# Patient Record
Sex: FEMALE | Race: AMERICAN INDIAN OR ALASKA NATIVE
[De-identification: names, ages, dates, MRNs, and addresses within clinical notes are randomized per-mention and may not be internally consistent; named-entity substitution may affect disease eponyms.]

---

## 2017-04-01 ENCOUNTER — HOSPITAL ENCOUNTER (EMERGENCY)
Dept: HOSPITAL 42 - ED | Age: 57
Discharge: HOME | End: 2017-04-01
Payer: COMMERCIAL

## 2017-04-01 VITALS — BODY MASS INDEX: 47.1 KG/M2

## 2017-04-01 VITALS — SYSTOLIC BLOOD PRESSURE: 151 MMHG | DIASTOLIC BLOOD PRESSURE: 87 MMHG | OXYGEN SATURATION: 95 % | HEART RATE: 92 BPM

## 2017-04-01 VITALS — RESPIRATION RATE: 22 BRPM | TEMPERATURE: 99.3 F

## 2017-04-01 DIAGNOSIS — J20.9: Primary | ICD-10-CM

## 2017-04-01 DIAGNOSIS — F17.210: ICD-10-CM

## 2017-04-01 LAB
ADD MANUAL DIFF?: NO
ALBUMIN/GLOB SERPL: 1 {RATIO} (ref 1.1–1.8)
ALP SERPL-CCNC: 94 U/L (ref 38–133)
ALT SERPL-CCNC: 23 U/L (ref 7–56)
APPEARANCE UR: CLEAR
APTT BLD: 30.8 SECONDS (ref 23.7–30.8)
AST SERPL-CCNC: 36 U/L (ref 15–39)
BASOPHILS # BLD AUTO: 0.01 K/MM3 (ref 0–2)
BASOPHILS NFR BLD: 0.2 % (ref 0–3)
BILIRUB SERPL-MCNC: 0.7 MG/DL (ref 0.2–1.3)
BILIRUB UR-MCNC: NEGATIVE MG/DL
BUN SERPL-MCNC: 6 MG/DL (ref 7–21)
CALCIUM SERPL-MCNC: 9.4 MG/DL (ref 8.4–10.5)
CHLORIDE SERPL-SCNC: 99 MMOL/L (ref 98–107)
CO2 SERPL-SCNC: 27 MMOL/L (ref 21–33)
COLOR UR: YELLOW
EOSINOPHIL # BLD: 0 10*3/UL (ref 0–0.7)
EOSINOPHIL NFR BLD: 0.2 % (ref 1.5–5)
ERYTHROCYTE [DISTWIDTH] IN BLOOD BY AUTOMATED COUNT: 14.9 % (ref 11.5–14.5)
GLOBULIN SER-MCNC: 4.5 GM/DL
GLUCOSE SERPL-MCNC: 143 MG/DL (ref 70–110)
GLUCOSE UR STRIP-MCNC: NEGATIVE MG/DL
GRANULOCYTES # BLD: 3.14 10*3/UL (ref 1.4–6.5)
GRANULOCYTES NFR BLD: 73.8 % (ref 50–68)
HCT VFR BLD CALC: 38.3 % (ref 36–48)
INR PPP: 1.07 (ref 0.93–1.08)
KETONES UR STRIP-MCNC: NEGATIVE MG/DL
LEUKOCYTE ESTERASE UR-ACNC: NEGATIVE LEU/UL
LYMPHOCYTES # BLD: 0.8 10*3/UL (ref 1.2–3.4)
LYMPHOCYTES NFR BLD AUTO: 18.3 % (ref 22–35)
MCH RBC QN AUTO: 30.7 PG (ref 25–35)
MCHC RBC AUTO-ENTMCNC: 33.7 G/DL (ref 31–37)
MCV RBC AUTO: 91.2 FL (ref 80–105)
MONOCYTES # BLD AUTO: 0.3 10*3/UL (ref 0.1–0.6)
MONOCYTES NFR BLD: 7.5 % (ref 1–6)
PH UR STRIP: 6 [PH] (ref 4.7–8)
PLATELET # BLD: 162 10^3/UL (ref 120–450)
PMV BLD AUTO: 11.4 FL (ref 7–11)
POTASSIUM SERPL-SCNC: 3.8 MMOL/L (ref 3.6–5)
PROT SERPL-MCNC: 8.9 G/DL (ref 5.8–8.3)
PROT UR STRIP-MCNC: NEGATIVE MG/DL
RBC # UR STRIP: NEGATIVE /UL
SODIUM SERPL-SCNC: 138 MMOL/L (ref 132–148)
SP GR UR STRIP: 1.01 (ref 1–1.03)
UROBILINOGEN UR STRIP-ACNC: 2 E.U./DL
WBC # BLD AUTO: 4.3 10^3/UL (ref 4.5–11)

## 2017-06-07 ENCOUNTER — HOSPITAL ENCOUNTER (INPATIENT)
Dept: HOSPITAL 42 - ED | Age: 57
LOS: 3 days | Discharge: HOME | DRG: 638 | End: 2017-06-10
Attending: FAMILY MEDICINE | Admitting: FAMILY MEDICINE
Payer: COMMERCIAL

## 2017-06-07 VITALS — BODY MASS INDEX: 44.6 KG/M2

## 2017-06-07 DIAGNOSIS — E11.65: Primary | ICD-10-CM

## 2017-06-07 DIAGNOSIS — I25.10: ICD-10-CM

## 2017-06-07 DIAGNOSIS — Z95.5: ICD-10-CM

## 2017-06-07 DIAGNOSIS — E78.5: ICD-10-CM

## 2017-06-07 DIAGNOSIS — I10: ICD-10-CM

## 2017-06-07 DIAGNOSIS — K42.9: ICD-10-CM

## 2017-06-07 DIAGNOSIS — E87.1: ICD-10-CM

## 2017-06-07 DIAGNOSIS — Z79.4: ICD-10-CM

## 2017-06-07 DIAGNOSIS — Z91.11: ICD-10-CM

## 2017-06-07 DIAGNOSIS — I25.2: ICD-10-CM

## 2017-06-07 DIAGNOSIS — Z87.891: ICD-10-CM

## 2017-06-07 DIAGNOSIS — E66.01: ICD-10-CM

## 2017-06-07 DIAGNOSIS — D64.9: ICD-10-CM

## 2017-06-07 DIAGNOSIS — K21.9: ICD-10-CM

## 2017-06-07 DIAGNOSIS — J45.909: ICD-10-CM

## 2017-06-07 LAB
ADD MANUAL DIFF?: NO
ALBUMIN/GLOB SERPL: 1.2 {RATIO} (ref 1.1–1.8)
ALP SERPL-CCNC: 112 U/L (ref 38–133)
ALT SERPL-CCNC: 45 U/L (ref 7–56)
APPEARANCE UR: (no result)
APTT BLD: 28.3 SECONDS (ref 23.7–30.8)
AST SERPL-CCNC: 34 U/L (ref 15–39)
BASE EXCESS BLDV CALC-SCNC: -1.8 MMOL/L (ref 0–2)
BASOPHILS # BLD AUTO: 0.02 K/MM3 (ref 0–2)
BASOPHILS NFR BLD: 0.3 % (ref 0–3)
BILIRUB SERPL-MCNC: 0.6 MG/DL (ref 0.2–1.3)
BILIRUB UR-MCNC: NEGATIVE MG/DL
BUN SERPL-MCNC: 16 MG/DL (ref 7–21)
CALCIUM SERPL-MCNC: 10.1 MG/DL (ref 8.4–10.5)
CHLORIDE SERPL-SCNC: 98 MMOL/L (ref 98–107)
CO2 SERPL-SCNC: 22 MMOL/L (ref 21–33)
COLOR UR: (no result)
EOSINOPHIL # BLD: 0 10*3/UL (ref 0–0.7)
EOSINOPHIL NFR BLD: 0.7 % (ref 1.5–5)
ERYTHROCYTE [DISTWIDTH] IN BLOOD BY AUTOMATED COUNT: 13.9 % (ref 11.5–14.5)
GLOBULIN SER-MCNC: 3.8 GM/DL
GLUCOSE SERPL-MCNC: 616 MG/DL (ref 70–110)
GLUCOSE UR STRIP-MCNC: >=1000 MG/DL
GRANULOCYTES # BLD: 3.81 10*3/UL (ref 1.4–6.5)
GRANULOCYTES NFR BLD: 62.3 % (ref 50–68)
HCT VFR BLD CALC: 39.5 % (ref 36–48)
INR PPP: 1.03 (ref 0.93–1.08)
KETONES UR STRIP-MCNC: NEGATIVE MG/DL
LEUKOCYTE ESTERASE UR-ACNC: (no result) LEU/UL
LYMPHOCYTES # BLD: 1.9 10*3/UL (ref 1.2–3.4)
LYMPHOCYTES NFR BLD AUTO: 30.6 % (ref 22–35)
MCH RBC QN AUTO: 30 PG (ref 25–35)
MCHC RBC AUTO-ENTMCNC: 33.7 G/DL (ref 31–37)
MCV RBC AUTO: 89 FL (ref 80–105)
MONOCYTES # BLD AUTO: 0.4 10*3/UL (ref 0.1–0.6)
MONOCYTES NFR BLD: 6.1 % (ref 1–6)
PH BLDV: 7.38 [PH] (ref 7.32–7.43)
PH UR STRIP: 6 [PH] (ref 4.7–8)
PLATELET # BLD: 169 10^3/UL (ref 120–450)
PMV BLD AUTO: 12.9 FL (ref 7–11)
POTASSIUM SERPL-SCNC: 4.5 MMOL/L (ref 3.6–5)
PROT SERPL-MCNC: 8.2 G/DL (ref 5.8–8.3)
PROT UR STRIP-MCNC: 30 MG/DL
RBC # UR STRIP: (no result) /UL
RBC #/AREA URNS HPF: (no result) /HPF (ref 0–2)
SODIUM SERPL-SCNC: 133 MMOL/L (ref 132–148)
SP GR UR STRIP: 1.01 (ref 1–1.03)
UROBILINOGEN UR STRIP-ACNC: 0.2 E.U./DL
WBC # BLD AUTO: 6.1 10^3/UL (ref 4.5–11)

## 2017-06-07 RX ADMIN — INSULIN LISPRO SCH UNITS: 100 INJECTION, SOLUTION INTRAVENOUS; SUBCUTANEOUS at 21:58

## 2017-06-07 RX ADMIN — INSULIN LISPRO SCH UNITS: 100 INJECTION, SOLUTION INTRAVENOUS; SUBCUTANEOUS at 16:41

## 2017-06-07 RX ADMIN — INSULIN LISPRO SCH UNITS: 100 INJECTION, SOLUTION INTRAVENOUS; SUBCUTANEOUS at 16:44

## 2017-06-08 VITALS — RESPIRATION RATE: 20 BRPM

## 2017-06-08 LAB
ALBUMIN/GLOB SERPL: 1.1 {RATIO} (ref 1.1–1.8)
ALP SERPL-CCNC: 87 U/L (ref 38–133)
ALT SERPL-CCNC: 44 U/L (ref 7–56)
AST SERPL-CCNC: 33 U/L (ref 15–39)
BILIRUB SERPL-MCNC: 0.5 MG/DL (ref 0.2–1.3)
BUN SERPL-MCNC: 10 MG/DL (ref 7–21)
CALCIUM SERPL-MCNC: 9 MG/DL (ref 8.4–10.5)
CHLORIDE SERPL-SCNC: 106 MMOL/L (ref 98–107)
CHOLEST SERPL-MCNC: 156 MG/DL (ref 130–200)
CO2 SERPL-SCNC: 22 MMOL/L (ref 21–33)
CORTIS AM PEAK SERPL-MCNC: 11 UG/DL (ref 4.46–22.7)
ERYTHROCYTE [DISTWIDTH] IN BLOOD BY AUTOMATED COUNT: 14.2 % (ref 11.5–14.5)
GLOBULIN SER-MCNC: 3.3 GM/DL
GLUCOSE SERPL-MCNC: 308 MG/DL (ref 70–110)
HCT VFR BLD CALC: 34.6 % (ref 36–48)
MCH RBC QN AUTO: 29.8 PG (ref 25–35)
MCHC RBC AUTO-ENTMCNC: 33.5 G/DL (ref 31–37)
MCV RBC AUTO: 88.9 FL (ref 80–105)
PLATELET # BLD: 159 10^3/UL (ref 120–450)
PMV BLD AUTO: 12.6 FL (ref 7–11)
POTASSIUM SERPL-SCNC: 3.7 MMOL/L (ref 3.6–5)
PROT SERPL-MCNC: 7 G/DL (ref 5.8–8.3)
SODIUM SERPL-SCNC: 137 MMOL/L (ref 132–148)
WBC # BLD AUTO: 5 10^3/UL (ref 4.5–11)

## 2017-06-08 RX ADMIN — INSULIN LISPRO SCH UNITS: 100 INJECTION, SOLUTION INTRAVENOUS; SUBCUTANEOUS at 12:33

## 2017-06-08 RX ADMIN — INSULIN LISPRO SCH UNITS: 100 INJECTION, SOLUTION INTRAVENOUS; SUBCUTANEOUS at 07:45

## 2017-06-08 RX ADMIN — INSULIN LISPRO SCH: 100 INJECTION, SOLUTION INTRAVENOUS; SUBCUTANEOUS at 21:37

## 2017-06-08 RX ADMIN — INSULIN LISPRO SCH UNITS: 100 INJECTION, SOLUTION INTRAVENOUS; SUBCUTANEOUS at 17:43

## 2017-06-09 LAB
ALBUMIN/GLOB SERPL: 1.3 {RATIO} (ref 1.1–1.8)
ALP SERPL-CCNC: 91 U/L (ref 38–133)
ALT SERPL-CCNC: 49 U/L (ref 7–56)
AST SERPL-CCNC: 42 U/L (ref 15–39)
BILIRUB SERPL-MCNC: 0.6 MG/DL (ref 0.2–1.3)
BUN SERPL-MCNC: 9 MG/DL (ref 7–21)
CALCIUM SERPL-MCNC: 9.2 MG/DL (ref 8.4–10.5)
CHLORIDE SERPL-SCNC: 104 MMOL/L (ref 95–110)
CO2 SERPL-SCNC: 22 MMOL/L (ref 21–33)
GLOBULIN SER-MCNC: 3.4 GM/DL
GLUCOSE SERPL-MCNC: 338 MG/DL (ref 70–110)
POTASSIUM SERPL-SCNC: 4.2 MMOL/L (ref 3.6–5)
PROT SERPL-MCNC: 7.7 G/DL (ref 5.8–8.3)
SODIUM SERPL-SCNC: 138 MMOL/L (ref 132–148)

## 2017-06-09 RX ADMIN — INSULIN LISPRO SCH UNITS: 100 INJECTION, SOLUTION INTRAVENOUS; SUBCUTANEOUS at 19:10

## 2017-06-09 RX ADMIN — INSULIN LISPRO SCH: 100 INJECTION, SOLUTION INTRAVENOUS; SUBCUTANEOUS at 22:50

## 2017-06-09 RX ADMIN — INSULIN LISPRO SCH UNITS: 100 INJECTION, SOLUTION INTRAVENOUS; SUBCUTANEOUS at 08:57

## 2017-06-09 RX ADMIN — INSULIN LISPRO SCH UNITS: 100 INJECTION, SOLUTION INTRAVENOUS; SUBCUTANEOUS at 13:32

## 2017-06-09 RX ADMIN — INSULIN LISPRO SCH UNITS: 100 INJECTION, SOLUTION INTRAVENOUS; SUBCUTANEOUS at 08:56

## 2017-06-10 VITALS — OXYGEN SATURATION: 98 % | HEART RATE: 76 BPM | TEMPERATURE: 97.7 F

## 2017-06-10 VITALS — SYSTOLIC BLOOD PRESSURE: 155 MMHG | DIASTOLIC BLOOD PRESSURE: 94 MMHG

## 2017-06-10 LAB
ADD MANUAL DIFF?: NO
ALBUMIN/GLOB SERPL: 1.2 {RATIO} (ref 1.1–1.8)
ALP SERPL-CCNC: 90 U/L (ref 38–133)
ALT SERPL-CCNC: 57 U/L (ref 7–56)
AST SERPL-CCNC: 35 U/L (ref 15–39)
BASOPHILS # BLD AUTO: 0.02 K/MM3 (ref 0–2)
BASOPHILS NFR BLD: 0.4 % (ref 0–3)
BILIRUB SERPL-MCNC: 0.6 MG/DL (ref 0.2–1.3)
BUN SERPL-MCNC: 9 MG/DL (ref 7–21)
CALCIUM SERPL-MCNC: 9.2 MG/DL (ref 8.4–10.5)
CHLORIDE SERPL-SCNC: 104 MMOL/L (ref 95–110)
CO2 SERPL-SCNC: 22 MMOL/L (ref 21–33)
EOSINOPHIL # BLD: 0.1 10*3/UL (ref 0–0.7)
EOSINOPHIL NFR BLD: 1.1 % (ref 1.5–5)
ERYTHROCYTE [DISTWIDTH] IN BLOOD BY AUTOMATED COUNT: 14 % (ref 11.5–14.5)
GLOBULIN SER-MCNC: 3.4 GM/DL
GLUCOSE SERPL-MCNC: 306 MG/DL (ref 70–110)
GRANULOCYTES # BLD: 2.49 10*3/UL (ref 1.4–6.5)
GRANULOCYTES NFR BLD: 54.1 % (ref 50–68)
HCT VFR BLD CALC: 36.5 % (ref 36–48)
IRON SERPL-MCNC: 60 UG/DL (ref 45–180)
LYMPHOCYTES # BLD: 1.8 10*3/UL (ref 1.2–3.4)
LYMPHOCYTES NFR BLD AUTO: 38.7 % (ref 22–35)
MCH RBC QN AUTO: 30.1 PG (ref 25–35)
MCHC RBC AUTO-ENTMCNC: 33.7 G/DL (ref 31–37)
MCV RBC AUTO: 89.2 FL (ref 80–105)
MONOCYTES # BLD AUTO: 0.3 10*3/UL (ref 0.1–0.6)
MONOCYTES NFR BLD: 5.7 % (ref 1–6)
PLATELET # BLD: 166 10^3/UL (ref 120–450)
PMV BLD AUTO: 13.1 FL (ref 7–11)
POTASSIUM SERPL-SCNC: 3.5 MMOL/L (ref 3.6–5)
PROT SERPL-MCNC: 7.7 G/DL (ref 5.8–8.3)
RETICS/RBC NFR: 1.16 % (ref 0.5–1.5)
SODIUM SERPL-SCNC: 138 MMOL/L (ref 132–148)
WBC # BLD AUTO: 4.6 10^3/UL (ref 4.5–11)

## 2017-06-10 RX ADMIN — INSULIN LISPRO SCH: 100 INJECTION, SOLUTION INTRAVENOUS; SUBCUTANEOUS at 12:53

## 2017-06-10 RX ADMIN — INSULIN LISPRO SCH UNITS: 100 INJECTION, SOLUTION INTRAVENOUS; SUBCUTANEOUS at 08:19

## 2017-07-10 ENCOUNTER — HOSPITAL ENCOUNTER (OUTPATIENT)
Dept: HOSPITAL 42 - CATH | Age: 57
Discharge: HOME | End: 2017-07-10
Payer: COMMERCIAL

## 2017-07-10 VITALS — RESPIRATION RATE: 18 BRPM | OXYGEN SATURATION: 99 %

## 2017-07-10 VITALS — HEART RATE: 78 BPM

## 2017-07-10 VITALS — SYSTOLIC BLOOD PRESSURE: 122 MMHG | DIASTOLIC BLOOD PRESSURE: 76 MMHG

## 2017-07-10 VITALS — TEMPERATURE: 97.7 F

## 2017-07-10 VITALS — BODY MASS INDEX: 44.6 KG/M2

## 2017-07-10 DIAGNOSIS — Z87.891: ICD-10-CM

## 2017-07-10 DIAGNOSIS — I25.10: Primary | ICD-10-CM

## 2017-07-10 DIAGNOSIS — Z95.5: ICD-10-CM

## 2017-07-10 DIAGNOSIS — E66.9: ICD-10-CM

## 2017-07-10 DIAGNOSIS — E11.9: ICD-10-CM

## 2017-07-10 DIAGNOSIS — Z79.4: ICD-10-CM

## 2017-07-10 DIAGNOSIS — I10: ICD-10-CM

## 2017-07-10 DIAGNOSIS — I25.2: ICD-10-CM

## 2017-07-10 DIAGNOSIS — E78.5: ICD-10-CM

## 2017-07-10 LAB
APTT BLD: 28 SECONDS (ref 23.7–30.8)
BASOPHILS # BLD AUTO: 0.02 K/MM3 (ref 0–2)
BASOPHILS NFR BLD: 0.4 % (ref 0–3)
BUN SERPL-MCNC: 9 MG/DL (ref 7–21)
CALCIUM SERPL-MCNC: 9.1 MG/DL (ref 8.4–10.5)
EOSINOPHIL # BLD: 0.1 10*3/UL (ref 0–0.7)
EOSINOPHIL NFR BLD: 2.2 % (ref 1.5–5)
ERYTHROCYTE [DISTWIDTH] IN BLOOD BY AUTOMATED COUNT: 14.4 % (ref 11.5–14.5)
GFR NON-AFRICAN AMERICAN: > 60
GRANULOCYTES # BLD: 2.72 10*3/UL (ref 1.4–6.5)
GRANULOCYTES NFR BLD: 50.8 % (ref 50–68)
HDLC SERPL-MCNC: 41 MG/DL (ref 29–60)
HGB BLD-MCNC: 11.4 GM/DL (ref 12–16)
INR PPP: 0.99 (ref 0.93–1.08)
LDLC SERPL-MCNC: 82 MG/DL (ref 0–129)
LYMPHOCYTES # BLD: 2.2 10*3/UL (ref 1.2–3.4)
LYMPHOCYTES NFR BLD AUTO: 41 % (ref 22–35)
MCH RBC QN AUTO: 28.6 PG (ref 25–35)
MCHC RBC AUTO-ENTMCNC: 31.9 G/DL (ref 31–37)
MCV RBC AUTO: 89.5 FL (ref 80–105)
MONOCYTES # BLD AUTO: 0.3 10*3/UL (ref 0.1–0.6)
MONOCYTES NFR BLD: 5.6 % (ref 1–6)
PLATELET # BLD: 187 10^3/UL (ref 120–450)
PMV BLD AUTO: 11.9 FL (ref 7–11)
PROTHROMBIN TIME: 10.7 SECONDS (ref 9.9–11.8)
RBC # BLD AUTO: 3.99 10^6/UL (ref 3.5–6.1)
WBC # BLD AUTO: 5.4 10^3/UL (ref 4.5–11)

## 2017-07-10 PROCEDURE — 93458 L HRT ARTERY/VENTRICLE ANGIO: CPT

## 2017-07-10 PROCEDURE — 86900 BLOOD TYPING SEROLOGIC ABO: CPT

## 2017-07-10 PROCEDURE — 86850 RBC ANTIBODY SCREEN: CPT

## 2017-07-10 PROCEDURE — 85025 COMPLETE CBC W/AUTO DIFF WBC: CPT

## 2017-07-10 PROCEDURE — 99152 MOD SED SAME PHYS/QHP 5/>YRS: CPT

## 2017-07-10 PROCEDURE — 85610 PROTHROMBIN TIME: CPT

## 2017-07-10 PROCEDURE — 80061 LIPID PANEL: CPT

## 2017-07-10 PROCEDURE — 36415 COLL VENOUS BLD VENIPUNCTURE: CPT

## 2017-07-10 PROCEDURE — 85730 THROMBOPLASTIN TIME PARTIAL: CPT

## 2017-07-10 PROCEDURE — 80048 BASIC METABOLIC PNL TOTAL CA: CPT

## 2017-12-02 ENCOUNTER — HOSPITAL ENCOUNTER (EMERGENCY)
Dept: HOSPITAL 42 - ED | Age: 57
Discharge: HOME | End: 2017-12-02
Payer: COMMERCIAL

## 2017-12-02 VITALS
RESPIRATION RATE: 18 BRPM | TEMPERATURE: 98.6 F | DIASTOLIC BLOOD PRESSURE: 70 MMHG | HEART RATE: 96 BPM | OXYGEN SATURATION: 98 % | SYSTOLIC BLOOD PRESSURE: 137 MMHG

## 2017-12-02 VITALS — BODY MASS INDEX: 44.6 KG/M2

## 2017-12-02 DIAGNOSIS — M54.5: Primary | ICD-10-CM

## 2018-02-16 ENCOUNTER — HOSPITAL ENCOUNTER (OUTPATIENT)
Dept: HOSPITAL 31 - C.SDS | Age: 58
Discharge: HOME | End: 2018-02-16
Attending: OBSTETRICS & GYNECOLOGY
Payer: COMMERCIAL

## 2018-02-16 VITALS — BODY MASS INDEX: 44.6 KG/M2

## 2018-02-16 VITALS
RESPIRATION RATE: 19 BRPM | SYSTOLIC BLOOD PRESSURE: 110 MMHG | HEART RATE: 92 BPM | TEMPERATURE: 98 F | DIASTOLIC BLOOD PRESSURE: 55 MMHG | OXYGEN SATURATION: 95 %

## 2018-02-16 DIAGNOSIS — R10.2: ICD-10-CM

## 2018-02-16 DIAGNOSIS — N84.0: Primary | ICD-10-CM

## 2018-02-16 DIAGNOSIS — Z79.84: ICD-10-CM

## 2018-02-16 DIAGNOSIS — N88.2: ICD-10-CM

## 2018-02-16 DIAGNOSIS — N93.9: ICD-10-CM

## 2018-02-16 DIAGNOSIS — I10: ICD-10-CM

## 2018-02-16 DIAGNOSIS — E78.5: ICD-10-CM

## 2018-02-16 DIAGNOSIS — N87.0: ICD-10-CM

## 2018-02-16 DIAGNOSIS — K21.9: ICD-10-CM

## 2018-02-16 DIAGNOSIS — E11.9: ICD-10-CM

## 2018-02-16 DIAGNOSIS — D25.9: ICD-10-CM

## 2018-02-16 PROCEDURE — 58561 HYSTEROSCOPY REMOVE MYOMA: CPT

## 2018-02-16 PROCEDURE — 82948 REAGENT STRIP/BLOOD GLUCOSE: CPT

## 2018-02-16 PROCEDURE — 88305 TISSUE EXAM BY PATHOLOGIST: CPT

## 2018-02-16 RX ADMIN — HYDROMORPHONE HYDROCHLORIDE PRN MG: 1 INJECTION, SOLUTION INTRAMUSCULAR; INTRAVENOUS; SUBCUTANEOUS at 14:50

## 2018-02-16 RX ADMIN — HYDROMORPHONE HYDROCHLORIDE PRN MG: 1 INJECTION, SOLUTION INTRAMUSCULAR; INTRAVENOUS; SUBCUTANEOUS at 15:26

## 2018-02-16 NOTE — PCM.SURG1
Surgeon's Initial Post Op Note





- Surgeon's Notes


Surgeon: Reba Kaiser MD


Assistant: none


Type of Anesthesia: General LMA


Pre-Operative Diagnosis: Abnormal uterine bleeding


Operative Findings: enlarged uteurs, cervical stenosis, multpoel polypoid 

lesiosn within cavity and submucosal myoma 0.5cm, bilateral ostia visulaized


Post-Operative Diagnosis: same as above, submucosal myoma, endometiral polyp


Operation Performed: Hysteroscopic myomectomy, endometrial polypectomy, 

Dilation and currettage


Specimen/Specimens Removed: endocervical currettings, enodmetrial currettins, 

submucosal myoma, endometrial polyp


Estimated Blood Loss: EBL {In ML}: 25


Blood Products Given: N/A


Drains Used: No Drains


Post-Op Condition: Good


Date of Surgery/Procedure: 02/16/18


Time of Surgery/Procedure: 13:00

## 2018-02-17 NOTE — OP
PROCEDURE DATE:  02/16/2018



SURGEON:  Reba Kaiser MD



ASSISTANT:  None.



TYPE OF ANESTHESIA:  General LMA.



PREOPERATIVE DIAGNOSIS:  Abnormal uterine bleeding.



OPERATIVE FINDINGS:  Enlarged uterus, cervical stenosis, multiple polypoid

lesions within cavity, submucosal myoma of 0.5 cm, bilateral ostia

visualized.



POSTOPERATIVE DIAGNOSES:  Abnormal uterine bleeding, submucosal myoma and

endometrial polyps.



OPERATION PERFORMED:  Hysteroscopy, myomectomy, endometrial polypectomy,

dilation and curettage.



SPECIMEN REMOVED:  Endocervical curettings, endometrial curettings,

submucosal myoma, and endometrial polyp.



ESTIMATED BLOOD LOSS:  25 mL.



BLOOD PRODUCTS:  None.



COMPLICATIONS:  None.



DESCRIPTION OF PROCEDURE:  The patient was taken to the operating room

where she was given general anesthesia.  Once it was found to be adequate,

she was positioned on the operating table in a dorsal supine position with

the legs supported using stirrups.  The patient was then prepped and draped

in the usual sterile fashion.  A time-out was confirmed correct patient and

correct procedure.  Bimanual examination was performed with the

above-mentioned findings.  A red rubber catheter was then inserted into the

urethra to drain the bladder.  Following this, a Patton retractor was placed

on the anterior and posterior fornix of the vagina.  The cervix was

adequately visualized and a single-tooth tenaculum was placed in the

anterior lip of the cervix.  Endocervical curettings were obtained with a

Dennyian curette and sent to Pathology on [facility].  Cervical stenosis noted.

The uterus was then sounded to 8 cm.  Following this, the cervix was

sequentially dilated  to allow for introduction of a 5 mm hysteroscope

under direct visualization using normal saline as the distention media. 

There were multiple polypoid masses noted within the endometrial cavity and

one submucosal lesion noted 0.5 cm in close proximity to the right tubal

ostia.  Bilateral ostia were visualized.  MyoSure device was then inserted

under direct visualization and the masses were then carefully resected all

of them.  Following this, the MyoSure device was removed.  A gentle

curettage was done 360 degrees until gritty texture was noted.  All

instruments were removed with good hemostasis noted at the tenaculum

puncture site.  At the end of the procedure, all needle, sponge and

instrument counts were noted and correct x2.  The patient tolerated the

procedure well and was transferred to the recovery room in stable

condition.





__________________________________________

Reba Kaiser MD



DD:  02/16/2018 18:13:42

DT:  02/16/2018 23:26:56

[facility] # [id number]

## 2018-04-25 ENCOUNTER — HOSPITAL ENCOUNTER (EMERGENCY)
Dept: HOSPITAL 42 - ED | Age: 58
Discharge: LEFT BEFORE DISCHARGE | End: 2018-04-25
Payer: COMMERCIAL

## 2018-04-25 VITALS — BODY MASS INDEX: 44.6 KG/M2

## 2018-04-25 DIAGNOSIS — E11.9: ICD-10-CM

## 2018-04-25 DIAGNOSIS — E78.5: ICD-10-CM

## 2018-04-25 DIAGNOSIS — R07.9: Primary | ICD-10-CM

## 2018-04-25 DIAGNOSIS — I25.2: ICD-10-CM

## 2018-04-25 DIAGNOSIS — I10: ICD-10-CM

## 2018-04-25 DIAGNOSIS — F17.210: ICD-10-CM

## 2018-04-25 DIAGNOSIS — Z95.5: ICD-10-CM

## 2018-04-25 LAB
ALBUMIN SERPL-MCNC: 4.5 G/DL (ref 3–4.8)
ALBUMIN/GLOB SERPL: 1.4 {RATIO} (ref 1.1–1.8)
ALT SERPL-CCNC: 33 U/L (ref 7–56)
AST SERPL-CCNC: 20 U/L (ref 14–36)
BASOPHILS # BLD AUTO: 0.01 K/MM3 (ref 0–2)
BASOPHILS NFR BLD: 0.2 % (ref 0–3)
BUN SERPL-MCNC: 12 MG/DL (ref 7–21)
CALCIUM SERPL-MCNC: 9.6 MG/DL (ref 8.4–10.5)
EOSINOPHIL # BLD: 0.1 10*3/UL (ref 0–0.7)
EOSINOPHIL NFR BLD: 1.1 % (ref 1.5–5)
ERYTHROCYTE [DISTWIDTH] IN BLOOD BY AUTOMATED COUNT: 15.1 % (ref 11.5–14.5)
GFR NON-AFRICAN AMERICAN: > 60
GRANULOCYTES # BLD: 3.3 10*3/UL (ref 1.4–6.5)
GRANULOCYTES NFR BLD: 51.3 % (ref 50–68)
HGB BLD-MCNC: 11.6 G/DL (ref 12–16)
LYMPHOCYTES # BLD: 2.7 10*3/UL (ref 1.2–3.4)
LYMPHOCYTES NFR BLD AUTO: 42.6 % (ref 22–35)
MCH RBC QN AUTO: 30.1 PG (ref 25–35)
MCHC RBC AUTO-ENTMCNC: 33.5 G/DL (ref 31–37)
MCV RBC AUTO: 89.6 FL (ref 80–105)
MONOCYTES # BLD AUTO: 0.3 10*3/UL (ref 0.1–0.6)
MONOCYTES NFR BLD: 4.8 % (ref 1–6)
PLATELET # BLD: 195 10^3/UL (ref 120–450)
PMV BLD AUTO: 10.3 FL (ref 7–11)
RBC # BLD AUTO: 3.86 10^6/UL (ref 3.5–6.1)
WBC # BLD AUTO: 6.4 10^3/UL (ref 4.5–11)

## 2018-04-26 VITALS
RESPIRATION RATE: 18 BRPM | OXYGEN SATURATION: 100 % | HEART RATE: 90 BPM | SYSTOLIC BLOOD PRESSURE: 135 MMHG | DIASTOLIC BLOOD PRESSURE: 80 MMHG | TEMPERATURE: 98 F

## 2018-04-26 LAB — TROPONIN I SERPL-MCNC: 0.01 NG/ML

## 2018-04-30 ENCOUNTER — HOSPITAL ENCOUNTER (INPATIENT)
Dept: HOSPITAL 31 - C.9S | Age: 58
LOS: 6 days | Discharge: HOME | DRG: 742 | End: 2018-05-06
Attending: OBSTETRICS & GYNECOLOGY | Admitting: OBSTETRICS & GYNECOLOGY
Payer: COMMERCIAL

## 2018-04-30 VITALS — BODY MASS INDEX: 44.9 KG/M2

## 2018-04-30 DIAGNOSIS — N32.89: ICD-10-CM

## 2018-04-30 DIAGNOSIS — D62: ICD-10-CM

## 2018-04-30 DIAGNOSIS — I10: ICD-10-CM

## 2018-04-30 DIAGNOSIS — N95.0: ICD-10-CM

## 2018-04-30 DIAGNOSIS — E11.9: ICD-10-CM

## 2018-04-30 DIAGNOSIS — D25.0: Primary | ICD-10-CM

## 2018-04-30 DIAGNOSIS — Z79.4: ICD-10-CM

## 2018-04-30 DIAGNOSIS — K66.0: ICD-10-CM

## 2018-05-03 PROCEDURE — 0UT90ZZ RESECTION OF UTERUS, OPEN APPROACH: ICD-10-PCS | Performed by: OBSTETRICS & GYNECOLOGY

## 2018-05-03 PROCEDURE — 0TJB8ZZ INSPECTION OF BLADDER, VIA NATURAL OR ARTIFICIAL OPENING ENDOSCOPIC: ICD-10-PCS | Performed by: OBSTETRICS & GYNECOLOGY

## 2018-05-03 PROCEDURE — 0DNU0ZZ RELEASE OMENTUM, OPEN APPROACH: ICD-10-PCS | Performed by: OBSTETRICS & GYNECOLOGY

## 2018-05-03 RX ADMIN — HYDROMORPHONE HYDROCHLORIDE PRN MG: 1 INJECTION, SOLUTION INTRAMUSCULAR; INTRAVENOUS; SUBCUTANEOUS at 15:15

## 2018-05-03 RX ADMIN — HYDROMORPHONE HYDROCHLORIDE PRN MG: 1 INJECTION, SOLUTION INTRAMUSCULAR; INTRAVENOUS; SUBCUTANEOUS at 17:12

## 2018-05-03 NOTE — PCM.SURG1
Surgeon's Initial Post Op Note





- Surgeon's Notes


Surgeon: Reba Kaiser MD


Assistant: Adelita Swanson MD


Type of Anesthesia: General Endo


Pre-Operative Diagnosis: Postmenopausal bleeding, Sympoamtic fibroid uterus, 

pelvic pain


Operative Findings: 12-14 week size heart s haped uteurs with subucosala myoma, 

no gross fallopian tubes or ovaires identifed, dense adehsiosn to anterior 

abodmina was 1 hour lysisting omental adhesiosn to gain accesss to periteonal 

cavity adn bladder adhesios to lower uterine segmetn and other adhesion. 

Cystoscopy bilateral uretral jets visulzed.  Dr Adelita Swanson was surgcal 

assisatn and present for etnire case and essential in gaining entry , retraction

, lysing adhesions, performing hysterectomy , obtaning hemostasis, and was 

pressent for entire case


Post-Operative Diagnosis: same as above


Operation Performed: Exploratory Laparatomy, Total abdominal hysterectomy, 

Lysis of adhesions, Cystoscopy


Specimen/Specimens Removed: Uterus, cervix


Estimated Blood Loss: EBL {In ML}: 400


Blood Products Given: N/A


Drains Used: No Drains


Date of Surgery/Procedure: 05/03/18


Time of Surgery/Procedure: 11:00

## 2018-05-04 LAB
ALBUMIN SERPL-MCNC: 3.4 G/DL (ref 3.5–5)
ALBUMIN/GLOB SERPL: 1 {RATIO} (ref 1–2.1)
ALT SERPL-CCNC: 55 U/L (ref 9–52)
AST SERPL-CCNC: 53 U/L (ref 14–36)
BASOPHILS # BLD AUTO: 0 K/UL (ref 0–0.2)
BASOPHILS NFR BLD: 0.5 % (ref 0–2)
BUN SERPL-MCNC: 9 MG/DL (ref 7–17)
CALCIUM SERPL-MCNC: 8.1 MG/DL (ref 8.6–10.4)
EOSINOPHIL # BLD AUTO: 0.1 K/UL (ref 0–0.7)
EOSINOPHIL NFR BLD: 1.1 % (ref 0–4)
ERYTHROCYTE [DISTWIDTH] IN BLOOD BY AUTOMATED COUNT: 15.4 % (ref 11.5–14.5)
ERYTHROCYTE [DISTWIDTH] IN BLOOD BY AUTOMATED COUNT: 15.6 % (ref 11.5–14.5)
GFR NON-AFRICAN AMERICAN: > 60
HGB BLD-MCNC: 7.6 G/DL (ref 11–16)
HGB BLD-MCNC: 7.9 G/DL (ref 11–16)
LYMPHOCYTES # BLD AUTO: 1.9 K/UL (ref 1–4.3)
LYMPHOCYTES NFR BLD AUTO: 25.8 % (ref 20–40)
MCH RBC QN AUTO: 30.9 PG (ref 27–31)
MCH RBC QN AUTO: 31.5 PG (ref 27–31)
MCHC RBC AUTO-ENTMCNC: 33.8 G/DL (ref 33–37)
MCHC RBC AUTO-ENTMCNC: 34.1 G/DL (ref 33–37)
MCV RBC AUTO: 91.4 FL (ref 81–99)
MCV RBC AUTO: 92.2 FL (ref 81–99)
MONOCYTES # BLD: 0.4 K/UL (ref 0–0.8)
MONOCYTES NFR BLD: 5.9 % (ref 0–10)
NEUTROPHILS # BLD: 5 K/UL (ref 1.8–7)
NEUTROPHILS NFR BLD AUTO: 66.7 % (ref 50–75)
NRBC BLD AUTO-RTO: 0.1 % (ref 0–2)
PLATELET # BLD: 189 K/UL (ref 130–400)
PLATELET # BLD: 190 K/UL (ref 130–400)
PMV BLD AUTO: 9.4 FL (ref 7.2–11.7)
PMV BLD AUTO: 9.9 FL (ref 7.2–11.7)
RBC # BLD AUTO: 2.45 MIL/UL (ref 3.8–5.2)
RBC # BLD AUTO: 2.51 MIL/UL (ref 3.8–5.2)
WBC # BLD AUTO: 7.5 K/UL (ref 4.8–10.8)
WBC # BLD AUTO: 8.1 K/UL (ref 4.8–10.8)

## 2018-05-04 RX ADMIN — OXYCODONE HYDROCHLORIDE AND ACETAMINOPHEN PRN TAB: 5; 325 TABLET ORAL at 09:00

## 2018-05-04 RX ADMIN — SIMETHICONE CHEW TAB 80 MG SCH MG: 80 TABLET ORAL at 21:38

## 2018-05-04 RX ADMIN — SIMETHICONE CHEW TAB 80 MG SCH MG: 80 TABLET ORAL at 17:32

## 2018-05-04 RX ADMIN — OXYCODONE HYDROCHLORIDE AND ACETAMINOPHEN PRN TAB: 5; 325 TABLET ORAL at 14:12

## 2018-05-04 RX ADMIN — SIMETHICONE CHEW TAB 80 MG SCH MG: 80 TABLET ORAL at 14:39

## 2018-05-04 RX ADMIN — OXYCODONE HYDROCHLORIDE AND ACETAMINOPHEN PRN TAB: 5; 325 TABLET ORAL at 21:37

## 2018-05-04 NOTE — OP
PROCEDURE DATE:  05/03/2018



SURGEON:  Reba Kaiser MD



ASSISTANT:  Dr. Michael Swanson.



TYPE OF ANESTHESIA:  General endotracheal.



PREOPERATIVE DIAGNOSES:  Postmenopausal bleeding, symptomatic fibroid

uterus, pelvic pain.



POSTOPERATIVE DIAGNOSES:  Postmenopausal bleeding, symptomatic fibroid

uterus, pelvic pain.



OPERATIVE FINDINGS:  A 12-to 14-week size heart shaped uterus, submucosal

myoma.  No gross fallopian tubes, ovaries identified.  Dense adhesions in

the anterior abdominal wall of omentum, lysing adhesions 1 hour and also to

gain access to the peritoneal cavity.  Bladder adhesions noted to the lower

uterine segment, and other adhesions, cystoscopy, bilateral ureteral jets. 

Dr. Michael Swanson, the surgical assistant was present for the entire case,

essential in gaining entry, retraction, exposure, lysing adhesions,

performing hysterectomy, attaining hemostasis, present for the entire case.



OPERATION PERFORMED:  Exploratory laparotomy, total abdominal hysterectomy,

lysis of adhesions, cystoscopy.



SPECIMENS REMOVED:  Uterus and cervix.



ESTIMATED BLOOD LOSS:  4 mL.



BLOOD PRODUCTS:  None.



COMPLICATIONS:  None.



DESCRIPTION OF PROCEDURE:  The patient was then transferred to the

operating room, where she was prepped and draped in usual sterile fashion. 

A time-out confirmed correct patient and correct procedure.  The patient

was given preoperative prophylactic antibiotics.  Pfannenstiel skin

incision was made into the abdomen down to the subcutaneous tissue,

muscular fascia, and peritoneum with careful care.  Hemostasis was

obtained.  Once inside the abdominal cavity, a self-retaining retractor was

placed to expose the pelvic cavity with three lap sponges.  The uterus was

then identified and grasped on the fundus with a cork screw with upward

traction, and this was after omental adhesions were carefully dissected

from the anterior abdominal wall.  Following this, after the uterus was

then mobilized on upward traction, the round ligaments on either side were

identified, and individually dissected and ligated with 0-Vicryl suture and

divided.  This allowed us to create a bladder flap by both blunt and sharp

dissection after carefully removing bladder adhesions with Metzenbaum

scissors in the clear space.  The fallopian tube and ovarian ligament were

not grossly identified on either side.  However, there was a atrophic type

right ovary noted, left ovary noted.  The utero-ovarian ligament was

isolated to the broad ligament from the uterine body and ligated with 0

Vicryl suture and divided as well.  The uterine vessel was then

skeletonized on either side and carefully dissected the bladder flap

anteriorly and posteriorly.  The peritoneum was dissected down towards the

uterosacral ligament.  The Tamica clamps were then placed at the isthmic

portion of the cervical body junction where the uterine arteries have

drained the uterus.  They were clamped, ligated, and divided using 0 Vicryl

suture.  The remainder of the uterus was then removed by the clamp, cut,

ligation technique using the 0 Vicryl on all major pedicles down to the

cardinal ligament with removal of uterus and cervix.  Vaginal cuff was then

closed in the usual manner, 0 Vicryl in interrupted manner.  Hemostasis was

inspected and secured throughout the entire abdomen.  The peritoneum was

then closed over the vaginal cuff using a 2-0 Vicryl suture in a running

continuous manner.  The right ovary was left in situ, suspended to the side

wall.  The lap, sponge, and instruments were removed.  Self-retaining

retractor was removed.  The patient tolerated the procedure nicely.  There

were no complications associated with the surgical procedure at this point.

The sponge count was correct x2.  The Bernard catheter was inspected and

clear urine was noted.  Having removed all instruments and packs, then

began closure of abdomen.  Peritoneum was reapproximated with 2-0 Vicryl in

a running continuous fashion.  The rectus was reapproximated and closed

with 2-0 chromic in an interrupted manner.  The fascia was closed with 0

Vicryl in running continuous fashion, subcutaneous closed with 2-0 Vicryl

in a running continuous manner.  The skin was closed with staples. 

Hemostasis was secured throughout the entire procedure.  The incision was

then closed and noted in the above operative findings.  The patient had

tolerated the procedure well.  Then the abdomen was cleaned and irrigated. 

Attention was then turned to the peritoneum.  The Bernard catheter was

removed.  Following this, a Vietnamese ____ cystoscope was then inserted.  The

urethra appeared normal.  The bladder was distended with normal saline. 

There were no sutures on gross evaluation.  There were bilateral ureteral

jets with patency noted on either side.  The cystoscope was then removed

and a new Bernard catheter was inserted.  At the end of procedure, all

needle, sponge, and instrument counts were correct x2.  The patient

tolerated the procedure well and transferred to recovery room in stable

condition.





__________________________________________

Reba Kaiser MD







DD:  05/03/2018 18:46:08

DT:  05/04/2018 3:30:03

[facility] # [id number]

## 2018-05-04 NOTE — CP.PCM.PN
<Shonna Johnson - Last Filed: 05/04/18 08:53>





Subjective





- Date & Time of Evaluation


Date of Evaluation: 05/04/18


Time of Evaluation: 07:59





- Subjective


Subjective: 


Patient was seen and examined at bedside in no acute distress.  Patient reports 

that she currently has pain 10/10 at incision site.  States that she was given 

Morphine with no relief in symptoms.  States that she only tolerated tea, not 

willing to tolerate other liquids at this time.  Patient also reports vomiting 

x 2 - yellow in color, s/p hysterectomy; however, currently denies nausea and 

vomiting.  Patient denies flatus and bowel movents. Otherwise, denies  fevers, 

chills, chest pain, SOB, palpitations, leg swelling/pain.





Objective





- Vital Signs/Intake and Output


Vital Signs (last 24 hours): 


 











Temp Pulse Resp BP Pulse Ox


 


 98.6 F   95 H  20   141/80   100 


 


 05/04/18 00:00  05/04/18 00:00  05/04/18 00:00  05/04/18 00:00  05/04/18 00:00








Intake and Output: 


 











 05/04/18 05/04/18





 06:59 18:59


 


Intake Total 125 


 


Balance 125 














- Medications


Medications: 


 Current Medications





Lactated Ringer's (Lactated Ringer's)  1,000 mls @ 125 mls/hr IV .Q8H KATIE


Cefazolin Sodium 3,000 mg/ (Sodium Chloride)  100 mls @ 100 mls/hr IVPB Q8H KATIE


   PRN Reason: Protocol


   Last Admin: 05/04/18 03:29 Dose:  100 mls/hr


Ibuprofen (Motrin Tab)  600 mg PO Q6H PRN


   PRN Reason: Pain, Mild (1-3)


Insulin Human Regular (Novolin R)  0 unit SC ACHS KATIE


   PRN Reason: Protocol


Morphine Sulfate/Sodium Chloride (Morphine Pca Monoject Barrel)  30 mg IV Q4H 

PRN; Protocol


   PRN Reason: Pain, moderate (4-7)


   Stop: 05/04/18 12:53


Ondansetron HCl (Zofran Inj)  4 mg IVP Q8H PRN


   PRN Reason: Nausea/Vomiting


   Last Admin: 05/03/18 21:22 Dose:  4 mg











- Constitutional


Appears: No Acute Distress





- Head Exam


Head Exam: ATRAUMATIC, NORMAL INSPECTION





- Eye Exam


Eye Exam: EOMI, Normal appearance





- ENT Exam


ENT Exam: Mucous Membranes Moist





- Respiratory Exam


Respiratory Exam: Clear to Ausculation Bilateral.  absent: Rales, Rhonchi, 

Wheezes





- Cardiovascular Exam


Cardiovascular Exam: REGULAR RHYTHM, +S1, +S2





- GI/Abdominal Exam


GI & Abdominal Exam: Distended (mild), Soft, Tenderness (surgical incision, 

diffuse mild tenderness LLQ with palpation), Normal Bowel Sounds


Additional comments: 


dressing clean, dry intact; obese abdomen





- Extremities Exam


Extremities Exam: Normal Inspection





- Neurological Exam


Neurological Exam: Alert, Awake, Oriented x3





- Psychiatric Exam


Psychiatric exam: Normal Affect, Normal Mood





- Skin


Skin Exam: Dry, Intact, Normal Color, Warm





Assessment and Plan





- Assessment and Plan (Free Text)


Plan: 


Patient is a 58 year old female s/p total abdominal hysterectomy; POD#1.





Plan/Assessment:


- Pain management:


   Discontinued morphine PCA


   Ibuprofen 600mg PO Q6h prn for mild pain


   Percocet 1 tab Q4 prn for moderate pain


   Percocet 2 tabs Q4 prn for severe pain


- Continue IV Antibiotic: Cefazolin 3g IV Q8h


- Continue LRs


- Continue Zofran 4mg IV Q8 prn for nausea


- Encourage ambulation.


- Liquid diet, advance as tolerated.





<Reba Kaiser - Last Filed: 05/05/18 07:53>





Objective





- Vital Signs/Intake and Output


Vital Signs (last 24 hours): 


 











Temp Pulse Resp BP Pulse Ox


 


 99.0 F   98 H  20   127/78   100 


 


 05/05/18 04:00  05/05/18 04:00  05/05/18 04:00  05/05/18 04:00  05/05/18 04:00











- Medications


Medications: 


 Current Medications





Lactated Ringer's (Lactated Ringer's)  1,000 mls @ 125 mls/hr IV .Q8H KATIE


Ibuprofen (Motrin Tab)  600 mg PO Q6H PRN


   PRN Reason: Pain, Mild (1-3)


Insulin Human Regular (Novolin R)  0 unit SC ACHS KATIE


   PRN Reason: Protocol


   Last Admin: 05/05/18 03:53 Dose:  Not Given


Ondansetron HCl (Zofran Inj)  4 mg IVP Q8H PRN


   PRN Reason: Nausea/Vomiting


   Last Admin: 05/03/18 21:22 Dose:  4 mg


Oxycodone/Acetaminophen (Percocet 5/325 Mg Tab)  1 tab PO Q4H PRN


   PRN Reason: Pain, moderate (4-7)


   Stop: 05/07/18 08:00


Oxycodone/Acetaminophen (Percocet 5/325 Mg Tab)  2 tab PO Q4H PRN


   PRN Reason: Pain, severe (8-10)


   Stop: 05/07/18 08:01


   Last Admin: 05/05/18 03:45 Dose:  2 tab


Simethicone (Mylicon Chew Tab)  80 mg PO QID KATIE


   Last Admin: 05/04/18 21:38 Dose:  80 mg











- Labs


Labs: 


 





 05/04/18 20:06 





 05/04/18 08:25 











Assessment and Plan





- Assessment and Plan (Free Text)


Plan: 





agree with above


pt sheldon and emaxiend


encourage mabaiton out of bed, avance diet


bowel regimen


richelle lightheand, dizzyness, cp, sob


plna


iron


advance diet


pain amgnetn


encouarhe ambaiotn


incentive spirometner

## 2018-05-05 RX ADMIN — HUMAN INSULIN SCH: 100 INJECTION, SOLUTION SUBCUTANEOUS at 03:53

## 2018-05-05 RX ADMIN — OXYCODONE HYDROCHLORIDE AND ACETAMINOPHEN PRN TAB: 5; 325 TABLET ORAL at 03:45

## 2018-05-05 RX ADMIN — OXYCODONE HYDROCHLORIDE AND ACETAMINOPHEN PRN TAB: 5; 325 TABLET ORAL at 09:09

## 2018-05-05 RX ADMIN — HUMAN INSULIN SCH: 100 INJECTION, SOLUTION SUBCUTANEOUS at 16:32

## 2018-05-05 RX ADMIN — SIMETHICONE CHEW TAB 80 MG SCH MG: 80 TABLET ORAL at 17:29

## 2018-05-05 RX ADMIN — HUMAN INSULIN SCH: 100 INJECTION, SOLUTION SUBCUTANEOUS at 22:07

## 2018-05-05 RX ADMIN — SIMETHICONE CHEW TAB 80 MG SCH MG: 80 TABLET ORAL at 13:39

## 2018-05-05 RX ADMIN — HUMAN INSULIN SCH: 100 INJECTION, SOLUTION SUBCUTANEOUS at 08:10

## 2018-05-05 RX ADMIN — HUMAN INSULIN SCH: 100 INJECTION, SOLUTION SUBCUTANEOUS at 11:32

## 2018-05-05 RX ADMIN — SIMETHICONE CHEW TAB 80 MG SCH MG: 80 TABLET ORAL at 09:09

## 2018-05-05 RX ADMIN — OXYCODONE HYDROCHLORIDE AND ACETAMINOPHEN PRN TAB: 5; 325 TABLET ORAL at 17:31

## 2018-05-05 RX ADMIN — PANTOPRAZOLE SODIUM SCH MG: 20 TABLET, DELAYED RELEASE ORAL at 22:46

## 2018-05-05 RX ADMIN — SIMETHICONE CHEW TAB 80 MG SCH MG: 80 TABLET ORAL at 22:13

## 2018-05-05 NOTE — CP.PCM.PN
Subjective





- Date & Time of Evaluation


Date of Evaluation: 05/05/18


Time of Evaluation: 07:15





- Subjective


Subjective: 





pt seen and examined and reprots pain comes adn goes. pt ambuaitng, voidign, 

not passing flatus, tolerateign diet, dneis any cp, sob, dizzynes, lightheand,d 

. 








Objective





- Vital Signs/Intake and Output


Vital Signs (last 24 hours): 


 











Temp Pulse Resp BP Pulse Ox


 


 99.0 F   98 H  20   127/78   100 


 


 05/05/18 04:00  05/05/18 04:00  05/05/18 04:00  05/05/18 04:00  05/05/18 04:00











- Medications


Medications: 


 Current Medications





Lactated Ringer's (Lactated Ringer's)  1,000 mls @ 125 mls/hr IV .Q8H KATIE


Ibuprofen (Motrin Tab)  600 mg PO Q6H PRN


   PRN Reason: Pain, Mild (1-3)


Insulin Human Regular (Novolin R)  0 unit SC ACHS KATIE


   PRN Reason: Protocol


   Last Admin: 05/05/18 03:53 Dose:  Not Given


Ondansetron HCl (Zofran Inj)  4 mg IVP Q8H PRN


   PRN Reason: Nausea/Vomiting


   Last Admin: 05/03/18 21:22 Dose:  4 mg


Oxycodone/Acetaminophen (Percocet 5/325 Mg Tab)  1 tab PO Q4H PRN


   PRN Reason: Pain, moderate (4-7)


   Stop: 05/07/18 08:00


Oxycodone/Acetaminophen (Percocet 5/325 Mg Tab)  2 tab PO Q4H PRN


   PRN Reason: Pain, severe (8-10)


   Stop: 05/07/18 08:01


   Last Admin: 05/05/18 03:45 Dose:  2 tab


Simethicone (Mylicon Chew Tab)  80 mg PO QID [facility]


   Last Admin: 05/04/18 21:38 Dose:  80 mg











- Labs


Labs: 


 





 05/04/18 20:06 





 05/04/18 08:25 











- Constitutional


Appears: Well, Non-toxic





- Head Exam


Head Exam: ATRAUMATIC, NORMAL INSPECTION





- ENT Exam


ENT Exam: Mucous Membranes Moist





- Neck Exam


Neck Exam: Full ROM





- Respiratory Exam


Respiratory Exam: Clear to Ausculation Bilateral, NORMAL BREATHING PATTERN





- Cardiovascular Exam


Cardiovascular Exam: REGULAR RHYTHM, +S1, +S2





- GI/Abdominal Exam


GI & Abdominal Exam: Soft, Tenderness, Normal Bowel Sounds


Additional comments: 





TTP near incison, no guraidn, no reobudn tendnere, no irigity


incison c/d/di





- Extremities Exam


Extremities Exam: Full ROM, Normal Inspection





- Back Exam


Back Exam: NORMAL INSPECTION





- Neurological Exam


Neurological Exam: CN II-XII Intact, Oriented x3





- Skin


Skin Exam: Normal Color





Assessment and Plan


(1) S/P hysterectomy


Assessment & Plan: 


1. Pain mamangent: percocet/motirn


2. Advan e diet


3. Encouraghe ambatin


4. Incentive spironter


5. Astham: albuterol bprn


6. HTN: Meds, VS per rptocoe


7. DM: Insulin with blood gluow moing


8. Iron /colace with acute on chronic bloodloss anemia, curent stable


Status: Acute

## 2018-05-06 VITALS
DIASTOLIC BLOOD PRESSURE: 71 MMHG | SYSTOLIC BLOOD PRESSURE: 107 MMHG | OXYGEN SATURATION: 99 % | RESPIRATION RATE: 98 BRPM | TEMPERATURE: 97.8 F

## 2018-05-06 VITALS — HEART RATE: 95 BPM

## 2018-05-06 LAB
ALBUMIN SERPL-MCNC: 3.4 G/DL (ref 3.5–5)
ALBUMIN/GLOB SERPL: 1.1 {RATIO} (ref 1–2.1)
ALT SERPL-CCNC: 45 U/L (ref 9–52)
AST SERPL-CCNC: 59 U/L (ref 14–36)
BASOPHILS # BLD AUTO: 0 K/UL (ref 0–0.2)
BASOPHILS NFR BLD: 0.5 % (ref 0–2)
BUN SERPL-MCNC: 6 MG/DL (ref 7–17)
CALCIUM SERPL-MCNC: 8.2 MG/DL (ref 8.6–10.4)
EOSINOPHIL # BLD AUTO: 0.1 K/UL (ref 0–0.7)
EOSINOPHIL NFR BLD: 2.7 % (ref 0–4)
ERYTHROCYTE [DISTWIDTH] IN BLOOD BY AUTOMATED COUNT: 15 % (ref 11.5–14.5)
GFR NON-AFRICAN AMERICAN: > 60
HGB BLD-MCNC: 7.6 G/DL (ref 11–16)
LYMPHOCYTES # BLD AUTO: 1.5 K/UL (ref 1–4.3)
LYMPHOCYTES NFR BLD AUTO: 26.8 % (ref 20–40)
MCH RBC QN AUTO: 31.4 PG (ref 27–31)
MCHC RBC AUTO-ENTMCNC: 34.1 G/DL (ref 33–37)
MCV RBC AUTO: 92.1 FL (ref 81–99)
MONOCYTES # BLD: 0.3 K/UL (ref 0–0.8)
MONOCYTES NFR BLD: 5.7 % (ref 0–10)
NEUTROPHILS # BLD: 3.6 K/UL (ref 1.8–7)
NEUTROPHILS NFR BLD AUTO: 64.3 % (ref 50–75)
NRBC BLD AUTO-RTO: 0.1 % (ref 0–2)
PLATELET # BLD: 158 K/UL (ref 130–400)
PMV BLD AUTO: 9.8 FL (ref 7.2–11.7)
RBC # BLD AUTO: 2.41 MIL/UL (ref 3.8–5.2)
WBC # BLD AUTO: 5.6 K/UL (ref 4.8–10.8)

## 2018-05-06 RX ADMIN — HUMAN INSULIN SCH: 100 INJECTION, SOLUTION SUBCUTANEOUS at 11:27

## 2018-05-06 RX ADMIN — PANTOPRAZOLE SODIUM SCH MG: 20 TABLET, DELAYED RELEASE ORAL at 09:12

## 2018-05-06 RX ADMIN — HUMAN INSULIN SCH: 100 INJECTION, SOLUTION SUBCUTANEOUS at 08:17

## 2018-05-06 RX ADMIN — OXYCODONE HYDROCHLORIDE AND ACETAMINOPHEN PRN TAB: 5; 325 TABLET ORAL at 02:20

## 2018-05-06 RX ADMIN — SIMETHICONE CHEW TAB 80 MG SCH MG: 80 TABLET ORAL at 09:11

## 2019-05-31 ENCOUNTER — HOSPITAL ENCOUNTER (EMERGENCY)
Dept: HOSPITAL 42 - ED | Age: 59
Discharge: HOME | End: 2019-05-31
Payer: COMMERCIAL

## 2019-05-31 VITALS
TEMPERATURE: 98 F | HEART RATE: 82 BPM | SYSTOLIC BLOOD PRESSURE: 165 MMHG | OXYGEN SATURATION: 95 % | DIASTOLIC BLOOD PRESSURE: 102 MMHG

## 2019-05-31 VITALS — BODY MASS INDEX: 44.3 KG/M2

## 2019-05-31 VITALS — RESPIRATION RATE: 18 BRPM

## 2019-05-31 DIAGNOSIS — I25.2: ICD-10-CM

## 2019-05-31 DIAGNOSIS — I25.10: ICD-10-CM

## 2019-05-31 DIAGNOSIS — Z82.49: ICD-10-CM

## 2019-05-31 DIAGNOSIS — Z83.3: ICD-10-CM

## 2019-05-31 DIAGNOSIS — R04.0: Primary | ICD-10-CM

## 2019-05-31 DIAGNOSIS — I10: ICD-10-CM

## 2019-05-31 DIAGNOSIS — E11.9: ICD-10-CM

## 2019-05-31 DIAGNOSIS — F17.210: ICD-10-CM
